# Patient Record
Sex: MALE | Race: WHITE | Employment: OTHER | URBAN - METROPOLITAN AREA
[De-identification: names, ages, dates, MRNs, and addresses within clinical notes are randomized per-mention and may not be internally consistent; named-entity substitution may affect disease eponyms.]

---

## 2024-03-19 ENCOUNTER — INITIAL CONSULT (OUTPATIENT)
Age: 64
End: 2024-03-19
Payer: MEDICARE

## 2024-03-19 VITALS
DIASTOLIC BLOOD PRESSURE: 90 MMHG | BODY MASS INDEX: 27.6 KG/M2 | SYSTOLIC BLOOD PRESSURE: 154 MMHG | HEIGHT: 70 IN | WEIGHT: 192.8 LBS | HEART RATE: 58 BPM

## 2024-03-19 DIAGNOSIS — E78.5 HYPERLIPIDEMIA, UNSPECIFIED HYPERLIPIDEMIA TYPE: ICD-10-CM

## 2024-03-19 DIAGNOSIS — I10 PRIMARY HYPERTENSION: ICD-10-CM

## 2024-03-19 DIAGNOSIS — R94.31 ABNORMAL EKG: ICD-10-CM

## 2024-03-19 DIAGNOSIS — R07.9 CHEST PAIN, UNSPECIFIED TYPE: Primary | ICD-10-CM

## 2024-03-19 PROCEDURE — 1123F ACP DISCUSS/DSCN MKR DOCD: CPT | Performed by: INTERNAL MEDICINE

## 2024-03-19 PROCEDURE — 3077F SYST BP >= 140 MM HG: CPT | Performed by: INTERNAL MEDICINE

## 2024-03-19 PROCEDURE — 99203 OFFICE O/P NEW LOW 30 MIN: CPT | Performed by: INTERNAL MEDICINE

## 2024-03-19 PROCEDURE — 93000 ELECTROCARDIOGRAM COMPLETE: CPT | Performed by: INTERNAL MEDICINE

## 2024-03-19 PROCEDURE — 3079F DIAST BP 80-89 MM HG: CPT | Performed by: INTERNAL MEDICINE

## 2024-03-19 RX ORDER — PITAVASTATIN CALCIUM 4.18 MG/1
TABLET, FILM COATED ORAL NIGHTLY
COMMUNITY

## 2024-03-19 RX ORDER — OMEPRAZOLE 40 MG/1
20 CAPSULE, DELAYED RELEASE ORAL DAILY
COMMUNITY
Start: 2024-02-17

## 2024-03-19 RX ORDER — TAMSULOSIN HYDROCHLORIDE 0.4 MG/1
0.4 CAPSULE ORAL DAILY
COMMUNITY
Start: 2024-03-11 | End: 2025-03-11

## 2024-03-19 RX ORDER — METOPROLOL SUCCINATE 50 MG/1
50 TABLET, EXTENDED RELEASE ORAL DAILY
Qty: 30 TABLET | Refills: 5 | Status: SHIPPED | OUTPATIENT
Start: 2024-03-19

## 2024-03-19 RX ORDER — MULTIVIT WITH MINERALS/LUTEIN
1000 TABLET ORAL 2 TIMES DAILY
COMMUNITY

## 2024-03-19 RX ORDER — ASPIRIN 81 MG/1
81 TABLET ORAL
COMMUNITY

## 2024-03-19 ASSESSMENT — ENCOUNTER SYMPTOMS
COLOR CHANGE: 0
NAUSEA: 0
BACK PAIN: 0
BOWEL INCONTINENCE: 0
ORTHOPNEA: 0
HEMOPTYSIS: 0
COUGH: 0
DIARRHEA: 0
WHEEZING: 0
BLURRED VISION: 0
SPUTUM PRODUCTION: 0
ABDOMINAL PAIN: 0
HOARSE VOICE: 0
HEMATOCHEZIA: 0
HEMATEMESIS: 0
VOMITING: 0
SHORTNESS OF BREATH: 1

## 2024-04-02 ENCOUNTER — OFFICE VISIT (OUTPATIENT)
Age: 64
End: 2024-04-02
Payer: MEDICARE

## 2024-04-02 VITALS
WEIGHT: 193.6 LBS | HEART RATE: 68 BPM | SYSTOLIC BLOOD PRESSURE: 138 MMHG | DIASTOLIC BLOOD PRESSURE: 82 MMHG | BODY MASS INDEX: 27.72 KG/M2 | HEIGHT: 70 IN

## 2024-04-02 DIAGNOSIS — I10 PRIMARY HYPERTENSION: ICD-10-CM

## 2024-04-02 DIAGNOSIS — E78.5 HYPERLIPIDEMIA, UNSPECIFIED HYPERLIPIDEMIA TYPE: ICD-10-CM

## 2024-04-02 DIAGNOSIS — R93.1 ABNORMAL NUCLEAR CARDIAC IMAGING TEST: ICD-10-CM

## 2024-04-02 DIAGNOSIS — R07.9 CHEST PAIN, UNSPECIFIED TYPE: ICD-10-CM

## 2024-04-02 DIAGNOSIS — R94.39 ABNORMAL NUCLEAR STRESS TEST: ICD-10-CM

## 2024-04-02 DIAGNOSIS — R94.31 ABNORMAL EKG: Primary | ICD-10-CM

## 2024-04-02 PROCEDURE — 3075F SYST BP GE 130 - 139MM HG: CPT | Performed by: INTERNAL MEDICINE

## 2024-04-02 PROCEDURE — 3079F DIAST BP 80-89 MM HG: CPT | Performed by: INTERNAL MEDICINE

## 2024-04-02 PROCEDURE — 1123F ACP DISCUSS/DSCN MKR DOCD: CPT | Performed by: INTERNAL MEDICINE

## 2024-04-02 PROCEDURE — 99214 OFFICE O/P EST MOD 30 MIN: CPT | Performed by: INTERNAL MEDICINE

## 2024-04-02 RX ORDER — SODIUM CHLORIDE 0.9 % (FLUSH) 0.9 %
5-40 SYRINGE (ML) INJECTION PRN
OUTPATIENT
Start: 2024-04-02

## 2024-04-02 RX ORDER — NITROGLYCERIN 0.4 MG/1
0.4 TABLET SUBLINGUAL EVERY 5 MIN PRN
Qty: 25 TABLET | Refills: 3 | Status: SHIPPED | OUTPATIENT
Start: 2024-04-02

## 2024-04-02 RX ORDER — SODIUM CHLORIDE 9 MG/ML
INJECTION, SOLUTION INTRAVENOUS PRN
OUTPATIENT
Start: 2024-04-02

## 2024-04-02 RX ORDER — SODIUM CHLORIDE 0.9 % (FLUSH) 0.9 %
5-40 SYRINGE (ML) INJECTION EVERY 12 HOURS SCHEDULED
OUTPATIENT
Start: 2024-04-02

## 2024-04-02 ASSESSMENT — ENCOUNTER SYMPTOMS
ORTHOPNEA: 0
COLOR CHANGE: 0
VOMITING: 0
BOWEL INCONTINENCE: 0
DIARRHEA: 0
HOARSE VOICE: 0
HEMATEMESIS: 0
HEMATOCHEZIA: 0
BACK PAIN: 0
HEMOPTYSIS: 0
COUGH: 0
SHORTNESS OF BREATH: 0
ABDOMINAL PAIN: 0
WHEEZING: 0
NAUSEA: 0
SPUTUM PRODUCTION: 0
BLURRED VISION: 0

## 2024-04-02 NOTE — PROGRESS NOTES
revascularization candidacy.Consider switching Livalo to Lipitor or Crestor in the future if possible.Continue ARB ( Edarbi).  -     Initiate PAT Protocol; Future  -     Left Heart Cath / Coronary Angiography; Standing  -     Left Heart Cath / Coronary Angiography  -     nitroGLYCERIN (NITROSTAT) 0.4 MG SL tablet; Place 1 tablet under the tongue every 5 minutes as needed for Chest pain up to max of 3 total doses. If no relief after 1 dose, call 911.    Hyperlipidemia, unspecified hyperlipidemia type:Continue Livalo for now.    Primary hypertension:Stable.Continue Edarbi and Toprol.Continue home BP and HR monitoring.    Other orders  -     Full code; Standing  -     Verify informed consent; Standing  -     Verify pre-procedure history and physical completed; Standing  -     Procedure Consent; Standing  -     Diagnosis for Procedure; Standing  -     Diet NPO Exceptions are: Sips of Water with Meds; Standing  -     Initiate Oxygen Therapy Protocol; Standing  -     CBC; Standing  -     Basic Metabolic Panel; Standing  -     sodium chloride flush 0.9 % injection 5-40 mL  -     sodium chloride flush 0.9 % injection 5-40 mL  -     0.9 % sodium chloride infusion          Disposition:    Return for Follow up after procedure.                JENNYFER ZELAYA MD  4/2/2024  3:49 PM

## 2024-04-03 PROBLEM — R93.1 ABNORMAL NUCLEAR CARDIAC IMAGING TEST: Status: ACTIVE | Noted: 2024-04-02

## 2024-05-03 DIAGNOSIS — R07.9 CHEST PAIN, UNSPECIFIED TYPE: ICD-10-CM

## 2024-05-03 LAB
ANION GAP SERPL CALC-SCNC: 11 MMOL/L (ref 9–18)
BUN SERPL-MCNC: 15 MG/DL (ref 8–23)
CALCIUM SERPL-MCNC: 9.4 MG/DL (ref 8.8–10.2)
CHLORIDE SERPL-SCNC: 100 MMOL/L (ref 98–107)
CO2 SERPL-SCNC: 27 MMOL/L (ref 20–28)
CREAT SERPL-MCNC: 0.82 MG/DL (ref 0.8–1.3)
ERYTHROCYTE [DISTWIDTH] IN BLOOD BY AUTOMATED COUNT: 12.8 % (ref 11.9–14.6)
GLUCOSE SERPL-MCNC: 100 MG/DL (ref 70–99)
HCT VFR BLD AUTO: 49.6 % (ref 41.1–50.3)
HGB BLD-MCNC: 16 G/DL (ref 13.6–17.2)
MCH RBC QN AUTO: 30.4 PG (ref 26.1–32.9)
MCHC RBC AUTO-ENTMCNC: 32.3 G/DL (ref 31.4–35)
MCV RBC AUTO: 94.3 FL (ref 82–102)
NRBC # BLD: 0 K/UL (ref 0–0.2)
PLATELET # BLD AUTO: 221 K/UL (ref 150–450)
PMV BLD AUTO: 9.7 FL (ref 9.4–12.3)
POTASSIUM SERPL-SCNC: 4.4 MMOL/L (ref 3.5–5.1)
RBC # BLD AUTO: 5.26 M/UL (ref 4.23–5.6)
SODIUM SERPL-SCNC: 138 MMOL/L (ref 136–145)
WBC # BLD AUTO: 6.3 K/UL (ref 4.3–11.1)

## 2024-05-08 NOTE — PROGRESS NOTES
Patient pre-assessment complete for [facility] scheduled for , arrival time 0730. Patient verified using . Patient instructed to bring a list of all home medications on the day of procedure. NPO status reinforced. Patient informed to take a full dose aspirin 325mg  or 81 mg x 4 on the day of procedure. Instructed they can take all other medications excluding vitamins & supplements. Patient verbalizes understanding of all instructions & denies any questions at this time.

## 2024-05-09 ENCOUNTER — HOSPITAL ENCOUNTER (OUTPATIENT)
Age: 64
Setting detail: OUTPATIENT SURGERY
Discharge: HOME | End: 2024-05-09
Attending: INTERNAL MEDICINE | Admitting: INTERNAL MEDICINE
Payer: MEDICARE

## 2024-05-09 VITALS
DIASTOLIC BLOOD PRESSURE: 78 MMHG | BODY MASS INDEX: 27.63 KG/M2 | WEIGHT: 193 LBS | HEIGHT: 70 IN | OXYGEN SATURATION: 96 % | RESPIRATION RATE: 10 BRPM | SYSTOLIC BLOOD PRESSURE: 122 MMHG | HEART RATE: 55 BPM

## 2024-05-09 DIAGNOSIS — R93.1 ABNORMAL NUCLEAR CARDIAC IMAGING TEST: ICD-10-CM

## 2024-05-09 PROBLEM — I20.0 ACCELERATING ANGINA (HCC): Status: ACTIVE | Noted: 2024-05-09

## 2024-05-09 LAB
ECHO BSA: 2.08 M2
EKG ATRIAL RATE: 59 BPM
EKG DIAGNOSIS: NORMAL
EKG P AXIS: 56 DEGREES
EKG P-R INTERVAL: 176 MS
EKG Q-T INTERVAL: 402 MS
EKG QRS DURATION: 98 MS
EKG QTC CALCULATION (BAZETT): 397 MS
EKG R AXIS: 41 DEGREES
EKG T AXIS: 64 DEGREES
EKG VENTRICULAR RATE: 59 BPM

## 2024-05-09 PROCEDURE — 6360000004 HC RX CONTRAST MEDICATION: Performed by: INTERNAL MEDICINE

## 2024-05-09 PROCEDURE — 99152 MOD SED SAME PHYS/QHP 5/>YRS: CPT | Performed by: INTERNAL MEDICINE

## 2024-05-09 PROCEDURE — C1769 GUIDE WIRE: HCPCS | Performed by: INTERNAL MEDICINE

## 2024-05-09 PROCEDURE — 93458 L HRT ARTERY/VENTRICLE ANGIO: CPT | Performed by: INTERNAL MEDICINE

## 2024-05-09 PROCEDURE — 2500000003 HC RX 250 WO HCPCS: Performed by: INTERNAL MEDICINE

## 2024-05-09 PROCEDURE — C1894 INTRO/SHEATH, NON-LASER: HCPCS | Performed by: INTERNAL MEDICINE

## 2024-05-09 PROCEDURE — 6360000002 HC RX W HCPCS: Performed by: INTERNAL MEDICINE

## 2024-05-09 PROCEDURE — 93010 ELECTROCARDIOGRAM REPORT: CPT | Performed by: INTERNAL MEDICINE

## 2024-05-09 PROCEDURE — 93005 ELECTROCARDIOGRAM TRACING: CPT | Performed by: INTERNAL MEDICINE

## 2024-05-09 PROCEDURE — 2709999900 HC NON-CHARGEABLE SUPPLY: Performed by: INTERNAL MEDICINE

## 2024-05-09 RX ORDER — MIDAZOLAM HYDROCHLORIDE 1 MG/ML
INJECTION INTRAMUSCULAR; INTRAVENOUS PRN
Status: DISCONTINUED | OUTPATIENT
Start: 2024-05-09 | End: 2024-05-09 | Stop reason: HOSPADM

## 2024-05-09 RX ORDER — LIDOCAINE HYDROCHLORIDE 10 MG/ML
INJECTION, SOLUTION INFILTRATION; PERINEURAL PRN
Status: DISCONTINUED | OUTPATIENT
Start: 2024-05-09 | End: 2024-05-09 | Stop reason: HOSPADM

## 2024-05-09 RX ORDER — HEPARIN SODIUM 200 [USP'U]/100ML
INJECTION, SOLUTION INTRAVENOUS CONTINUOUS PRN
Status: COMPLETED | OUTPATIENT
Start: 2024-05-09 | End: 2024-05-09

## 2024-05-09 NOTE — PROGRESS NOTES
Discharge instructions given per orders, voiced good understanding of R wrist care, medications & follow up care. Denies any questions

## 2024-05-09 NOTE — DISCHARGE INSTRUCTIONS
HEART CATHETERIZATION/ANGIOGRAPHY DISCHARGE INSTRUCTIONS    Check puncture site frequently for swelling or bleeding. If there is any bleeding, apply pressure over the area with a clean towel or washcloth and call 911. Notify your doctor for any redness, swelling, drainage, or oozing from the puncture site. Notify your doctor for any fever or chills.  If the extremity becomes cold, numb, or painful call [facility]  Activity should be limited for the next 48 hours. No heavy lifting, pushing, pulling  or strenuous activity for 48 hours. No heavy lifting (anything over 10 pounds) for 3 days.  You may resume your usual diet. Drink more fluids than usual.  Have a responsible person drive you home and stay with you for at least 24 hours after your heart catheterization/angiography.  You may remove bandage from your R wrist in 24 hours. You may shower in 24 hours. No tub baths, hot tubs, or swimming for 1 week. Do not place any lotions, creams, powders, or ointments over puncture site for 1 week. You may place a clean band-aid over the puncture site each day for 5 days. Change daily.        Sedation for a Medical Procedure: Care Instructions     You were given a sedative medication during your visit. While many of the effects will have worn   off before you leave; you may continue to feel some effects for several hours.      Common side effects from sedation include:  Feeling sleepy. (Your doctors and nurses will make sure you are not too sleepy to go home.)  Nausea and vomiting. This usually does not last long.  Feeling tired.     How can you care for yourself at home?  Activity    Don't do anything for 24 hours that requires attention to detail. It takes time for the medicine effects to completely wear off.     Do not make important legal decisions for 24 hours.     Do not sign any legal documents for 24 hours.     Do not drink alcohol today     For your safety, you should not drive or operate heavy machinery for

## 2024-05-09 NOTE — PROGRESS NOTES
TRANSFER - IN REPORT:    Verbal report received from [employer]aning on Marc Craig being received from [facility] for routine post-op      Report consisted of patient’s Situation, Background, Assessment and Recommendations(SBAR).     Information from the following report(s) Procedure Summary was reviewed with the receiving nurse.    Opportunity for questions and clarification was provided.      Assessment completed upon patient’s arrival to unit and care assumed.

## 2024-05-09 NOTE — PROGRESS NOTES
TRANSFER - OUT REPORT:    Verbal report given to Madhavi Choudhury RN on Marc Craig  being transferred to CPRU for routine progression of patient care       Report consisted of patient's Situation, Background, Assessment and   Recommendations(SBAR).     Information from the following report(s) Nurse Handoff Report was reviewed with the receiving nurse.         [facility] with Dr Burt  No interventions  R Radial  TR band at 12mL  Versed 4mg  Fentanyl 75mcg  Heparin 5000 units

## 2024-05-09 NOTE — PROGRESS NOTES
Patient received to CPR[facility] room # 12  Ambulatory from [facility]. Patient scheduled for C today with Dr Burt. Procedure reviewed & questions answered, voiced good understanding consent obtained & placed on chart. All medications and medical history reviewed. Will prep patient per orders. Patient & family updated on plan of care.      The patient has a fraility score of 3-MANAGING WELL, based on ambulation without assistance.    Patient took Aspirin 324mg today at 0430 prior to arrival.

## 2024-05-13 ENCOUNTER — HOSPITAL ENCOUNTER (EMERGENCY)
Age: 64
Discharge: HOME | End: 2024-05-13
Attending: EMERGENCY MEDICINE
Payer: MEDICARE

## 2024-05-13 ENCOUNTER — APPOINTMENT (OUTPATIENT)
Dept: GENERAL RADIOLOGY | Age: 64
End: 2024-05-13
Payer: MEDICARE

## 2024-05-13 VITALS
SYSTOLIC BLOOD PRESSURE: 132 MMHG | HEART RATE: 52 BPM | TEMPERATURE: 98.8 F | RESPIRATION RATE: 18 BRPM | HEIGHT: 70 IN | BODY MASS INDEX: 27.63 KG/M2 | WEIGHT: 193 LBS | OXYGEN SATURATION: 96 % | DIASTOLIC BLOOD PRESSURE: 88 MMHG

## 2024-05-13 DIAGNOSIS — I25.10 CORONARY ARTERY DISEASE INVOLVING NATIVE HEART WITHOUT ANGINA PECTORIS, UNSPECIFIED VESSEL OR LESION TYPE: ICD-10-CM

## 2024-05-13 DIAGNOSIS — R42 LIGHTHEADEDNESS: Primary | ICD-10-CM

## 2024-05-13 LAB
ALBUMIN SERPL-MCNC: 4.1 G/DL (ref 3.2–4.6)
ALBUMIN/GLOB SERPL: 1.2 (ref 1–1.9)
ALP SERPL-CCNC: 69 U/L (ref 40–129)
ALT SERPL-CCNC: 16 U/L (ref 12–65)
ANION GAP BLD CALC-SCNC: 9.3 MMOL/L
ANION GAP SERPL CALC-SCNC: 10 MMOL/L (ref 9–18)
AST SERPL-CCNC: 24 U/L (ref 15–37)
BASOPHILS # BLD: 0 K/UL (ref 0–0.2)
BASOPHILS NFR BLD: 1 % (ref 0–2)
BILIRUB SERPL-MCNC: 0.3 MG/DL (ref 0–1.2)
BUN BLD-MCNC: 19 MG/DL (ref 8–26)
BUN SERPL-MCNC: 17 MG/DL (ref 8–23)
CALCIUM SERPL-MCNC: 9.1 MG/DL (ref 8.8–10.2)
CHLORIDE BLD-SCNC: 108 MMOL/L (ref 98–107)
CHLORIDE SERPL-SCNC: 104 MMOL/L (ref 98–107)
CO2 BLD-SCNC: 25.7 MMOL/L (ref 21–32)
CO2 SERPL-SCNC: 23 MMOL/L (ref 20–28)
CREAT BLD-MCNC: 0.98 MG/DL (ref 0.8–1.5)
CREAT SERPL-MCNC: 0.94 MG/DL (ref 0.8–1.3)
DIFFERENTIAL METHOD BLD: NORMAL
EKG ATRIAL RATE: 60 BPM
EKG DIAGNOSIS: ABNORMAL
EKG P AXIS: 46 DEGREES
EKG P-R INTERVAL: 188 MS
EKG Q-T INTERVAL: 404 MS
EKG QRS DURATION: 130 MS
EKG QTC CALCULATION (BAZETT): 404 MS
EKG R AXIS: 50 DEGREES
EKG T AXIS: 81 DEGREES
EKG VENTRICULAR RATE: 60 BPM
EOSINOPHIL # BLD: 0.1 K/UL (ref 0–0.8)
EOSINOPHIL NFR BLD: 2 % (ref 0.5–7.8)
ERYTHROCYTE [DISTWIDTH] IN BLOOD BY AUTOMATED COUNT: 12.5 % (ref 11.9–14.6)
GLOBULIN SER CALC-MCNC: 3.4 G/DL (ref 2.3–3.5)
GLUCOSE BLD-MCNC: 96 MG/DL (ref 65–100)
GLUCOSE SERPL-MCNC: 115 MG/DL (ref 70–99)
HCT VFR BLD AUTO: 48.4 % (ref 41.1–50.3)
HGB BLD-MCNC: 16.4 G/DL (ref 13.6–17.2)
IMM GRANULOCYTES # BLD AUTO: 0 K/UL (ref 0–0.5)
IMM GRANULOCYTES NFR BLD AUTO: 1 % (ref 0–5)
LYMPHOCYTES # BLD: 0.9 K/UL (ref 0.5–4.6)
LYMPHOCYTES NFR BLD: 13 % (ref 13–44)
MAGNESIUM SERPL-MCNC: 1.8 MG/DL (ref 1.8–2.4)
MCH RBC QN AUTO: 31.2 PG (ref 26.1–32.9)
MCHC RBC AUTO-ENTMCNC: 33.9 G/DL (ref 31.4–35)
MCV RBC AUTO: 92 FL (ref 82–102)
MONOCYTES # BLD: 0.7 K/UL (ref 0.1–1.3)
MONOCYTES NFR BLD: 11 % (ref 4–12)
NEUTS SEG # BLD: 4.8 K/UL (ref 1.7–8.2)
NEUTS SEG NFR BLD: 72 % (ref 43–78)
NRBC # BLD: 0 K/UL (ref 0–0.2)
NT PRO BNP: 181 PG/ML (ref 0–125)
PHOSPHATE SERPL-MCNC: 2.7 MG/DL (ref 2.5–4.5)
PLATELET # BLD AUTO: 209 K/UL (ref 150–450)
PMV BLD AUTO: 9.5 FL (ref 9.4–12.3)
POTASSIUM BLD-SCNC: 5.2 MMOL/L (ref 3.5–5.1)
POTASSIUM SERPL-SCNC: 4 MMOL/L (ref 3.5–5.1)
POTASSIUM SERPL-SCNC: NORMAL MMOL/L (ref 3.5–5.1)
PROT SERPL-MCNC: 7.4 G/DL (ref 6.3–8.2)
RBC # BLD AUTO: 5.26 M/UL (ref 4.23–5.6)
SODIUM BLD-SCNC: 143 MMOL/L (ref 136–145)
SODIUM SERPL-SCNC: 137 MMOL/L (ref 136–145)
TROPONIN T SERPL HS-MCNC: 11 NG/L (ref 0–22)
TROPONIN T SERPL HS-MCNC: 11 NG/L (ref 0–22)
WBC # BLD AUTO: 6.6 K/UL (ref 4.3–11.1)

## 2024-05-13 PROCEDURE — 80053 COMPREHEN METABOLIC PANEL: CPT

## 2024-05-13 PROCEDURE — 80047 BASIC METABLC PNL IONIZED CA: CPT

## 2024-05-13 PROCEDURE — 84132 ASSAY OF SERUM POTASSIUM: CPT

## 2024-05-13 PROCEDURE — 99285 EMERGENCY DEPT VISIT HI MDM: CPT

## 2024-05-13 PROCEDURE — 85025 COMPLETE CBC W/AUTO DIFF WBC: CPT

## 2024-05-13 PROCEDURE — 93005 ELECTROCARDIOGRAM TRACING: CPT | Performed by: EMERGENCY MEDICINE

## 2024-05-13 PROCEDURE — 83880 ASSAY OF NATRIURETIC PEPTIDE: CPT

## 2024-05-13 PROCEDURE — 2580000003 HC RX 258: Performed by: EMERGENCY MEDICINE

## 2024-05-13 PROCEDURE — 83735 ASSAY OF MAGNESIUM: CPT

## 2024-05-13 PROCEDURE — 84484 ASSAY OF TROPONIN QUANT: CPT

## 2024-05-13 PROCEDURE — 71045 X-RAY EXAM CHEST 1 VIEW: CPT

## 2024-05-13 PROCEDURE — 93010 ELECTROCARDIOGRAM REPORT: CPT | Performed by: INTERNAL MEDICINE

## 2024-05-13 PROCEDURE — 84100 ASSAY OF PHOSPHORUS: CPT

## 2024-05-13 RX ORDER — 0.9 % SODIUM CHLORIDE 0.9 %
1000 INTRAVENOUS SOLUTION INTRAVENOUS
Status: COMPLETED | OUTPATIENT
Start: 2024-05-13 | End: 2024-05-13

## 2024-05-13 RX ADMIN — SODIUM CHLORIDE 1000 ML: 9 INJECTION, SOLUTION INTRAVENOUS at 14:33

## 2024-05-13 ASSESSMENT — ENCOUNTER SYMPTOMS
BLOOD IN STOOL: 0
EYE DISCHARGE: 0
VOMITING: 0
COUGH: 0
EYE ITCHING: 0
CHEST TIGHTNESS: 0
NAUSEA: 0
SHORTNESS OF BREATH: 0

## 2024-05-13 ASSESSMENT — LIFESTYLE VARIABLES
HOW MANY STANDARD DRINKS CONTAINING ALCOHOL DO YOU HAVE ON A TYPICAL DAY: 1 OR 2
HOW OFTEN DO YOU HAVE A DRINK CONTAINING ALCOHOL: 2-4 TIMES A MONTH

## 2024-05-13 ASSESSMENT — PAIN SCALES - GENERAL
PAINLEVEL_OUTOF10: 0
PAINLEVEL_OUTOF10: 0

## 2024-05-13 ASSESSMENT — PAIN DESCRIPTION - PAIN TYPE: TYPE: ACUTE PAIN

## 2024-05-13 ASSESSMENT — PAIN - FUNCTIONAL ASSESSMENT
PAIN_FUNCTIONAL_ASSESSMENT: 0-10
PAIN_FUNCTIONAL_ASSESSMENT: 0-10

## 2024-05-13 NOTE — DISCHARGE INSTRUCTIONS
Continue all your current medications  Drink plenty fluids  Get plenty of rest  Call your cardiologist in the morning to discuss follow-up and further evaluation    Call your doctor or the follow up doctor to set up appointment for recheck visit    Return to ER for any worsening symptoms or new problems which may arise

## 2024-05-13 NOTE — ED NOTES
Patient mobility status  with no difficulty. Provider aware     I have reviewed discharge instructions with the patient.  The patient verbalized understanding.    Patient left ED via Discharge Method: ambulatory to Home with Spouse.    Opportunity for questions and clarification provided.     Patient given 0 scripts.           Kathrine Manjarrez RN  05/13/24 [id number]

## 2024-05-13 NOTE — ED TRIAGE NOTES
Patient to triage via EMS from work after having a spell of lightheaded, dizziness, and all over tingling. Pt states he had a cath about 4 days ago with no stents placed. Per pt the tingling has subsided but he is still feeling a little lightheaded. Pt state he took a nitro prior to EMS arrival but states it did not really make any difference.

## 2024-05-13 NOTE — ED PROVIDER NOTES
Emergency Department Provider Note       PCP: Michael Faustin Jr., MD   Age: 63 y.o.   Sex: male     DISPOSITION Decision To Discharge 05/13/2024 05:36:11 PM       ICD-10-CM    1. Lightheadedness  R42       2. Coronary artery disease involving native heart without angina pectoris, unspecified vessel or lesion type  I25.10           Medical Decision Making     Left heart catheterization 5/9/2024:  Conclusion         Obstructive one-vessel coronary artery disease involving a occluded mid LAD with left to left collaterals.    Ischemic cardiomyopathy with mildly reduced LV systolic function.  EF 40 to 45%.  Apical akinesis.    Right radial approach: Tiger 4.0 for left main coronary injection and JR 5 for right coronary injection.    Plan: Patient denies any angina for the last several months.  Fixed defect on stress testing without any reversible ischemia.  Appears completed apical infarct due to occlusion of the mid LAD.  Medical management.  63-year-old male patient with a history of coronary disease here with episodes of lightheadedness while at work today  Patient hemodynamically stable here he is on Toprol and heart rate has been in the 50s for the most part  Asymptomatic during his stay here in the ER  Troponin negative x 2 BNP only minimally above normal with a clear chest x-ray and no respiratory complaints  Given his negative workup and remaining asymptomatic, I feel the patient is stable to be discharged  He is encouraged to follow-up with his cardiologist and primary care doctors tomorrow to discuss any further evaluation and treatment  Return precautions discussed with patient and family at bedside     1 acute illness with systemic symptoms.  Patient was discharged risks and benefits of hospitalization were considered.  Chronic medical problems impacting care include CAD hypertension hyperlipidemia.  Shared medical decision making was utilized in creating the patients health plan today.    I

## 2024-05-14 ENCOUNTER — TELEPHONE (OUTPATIENT)
Age: 64
End: 2024-05-14

## 2024-05-14 DIAGNOSIS — I10 PRIMARY HYPERTENSION: ICD-10-CM

## 2024-05-14 DIAGNOSIS — R07.9 CHEST PAIN, UNSPECIFIED TYPE: ICD-10-CM

## 2024-05-14 DIAGNOSIS — R94.31 ABNORMAL EKG: ICD-10-CM

## 2024-05-14 RX ORDER — METOPROLOL SUCCINATE 50 MG/1
25 TABLET, EXTENDED RELEASE ORAL DAILY
Qty: 45 TABLET | Refills: 3
Start: 2024-05-14

## 2024-05-14 NOTE — TELEPHONE ENCOUNTER
Suddenly felt very weak, light headed, and dizzy with tingling in arms, when sitting at desk at work at [employer], yesterday.  Seen in [facility] ER, and was told all tests normal, but HR-49-52 on monitor in ER, so ER doctor suggested lower Toprol XL dose.   No palpitations, SOB, or chest pain.   Feeling okay, today.   Taking ASA 81 mg qd, Edarbi 40 mg qd, Toprol XL 50 mg qd, and Livalo 4 mg qd.   Next scheduled appointment with Dr. Arvizu is 5/28/24.     Patient asks if he may decrease Toprol XL dose to 25 mg qd and asks for any other recommendations.

## 2024-05-14 NOTE — TELEPHONE ENCOUNTER
passed out at work yesterday. They told him they think the metoprolol is too strong causing heart rate to be too low. Please call

## 2024-05-14 NOTE — TELEPHONE ENCOUNTER
Advised patient of Dr. Patiño's response. Advised patient to continue to monitor BP and HR and call with any further questions or concerns. Patient verbalized understanding.   Requested Prescriptions     Signed Prescriptions Disp Refills    metoprolol succinate (TOPROL XL) 50 MG extended release tablet 45 tablet 3     Sig: Take 0.5 tablets by mouth daily     Authorizing Provider: JENNYFER ZELAYA     Ordering User: HUNTER STOUT

## 2024-05-28 ENCOUNTER — OFFICE VISIT (OUTPATIENT)
Age: 64
End: 2024-05-28
Payer: MEDICARE

## 2024-05-28 VITALS
BODY MASS INDEX: 26.77 KG/M2 | HEIGHT: 70 IN | WEIGHT: 187 LBS | DIASTOLIC BLOOD PRESSURE: 78 MMHG | SYSTOLIC BLOOD PRESSURE: 130 MMHG | HEART RATE: 64 BPM

## 2024-05-28 DIAGNOSIS — I25.10 CORONARY ARTERY DISEASE INVOLVING NATIVE CORONARY ARTERY OF NATIVE HEART WITHOUT ANGINA PECTORIS: ICD-10-CM

## 2024-05-28 DIAGNOSIS — I10 PRIMARY HYPERTENSION: Primary | ICD-10-CM

## 2024-05-28 DIAGNOSIS — E78.5 HYPERLIPIDEMIA, UNSPECIFIED HYPERLIPIDEMIA TYPE: ICD-10-CM

## 2024-05-28 PROCEDURE — 3078F DIAST BP <80 MM HG: CPT | Performed by: INTERNAL MEDICINE

## 2024-05-28 PROCEDURE — 1123F ACP DISCUSS/DSCN MKR DOCD: CPT | Performed by: INTERNAL MEDICINE

## 2024-05-28 PROCEDURE — 3075F SYST BP GE 130 - 139MM HG: CPT | Performed by: INTERNAL MEDICINE

## 2024-05-28 PROCEDURE — 99214 OFFICE O/P EST MOD 30 MIN: CPT | Performed by: INTERNAL MEDICINE

## 2024-05-28 ASSESSMENT — ENCOUNTER SYMPTOMS
HEMATOCHEZIA: 0
DIARRHEA: 0
HOARSE VOICE: 0
HEMATEMESIS: 0
COLOR CHANGE: 0
SPUTUM PRODUCTION: 0
BLURRED VISION: 0
CHEST TIGHTNESS: 0
SHORTNESS OF BREATH: 0
ABDOMINAL PAIN: 0
WHEEZING: 0
BOWEL INCONTINENCE: 0
ORTHOPNEA: 0

## 2024-05-28 NOTE — PROGRESS NOTES
Differential Type AUTOMATED      Neutrophils % 72 43 - 78 %    Lymphocytes % 13 13 - 44 %    Monocytes % 11 4.0 - 12.0 %    Eosinophils % 2 0.5 - 7.8 %    Basophils % 1 0.0 - 2.0 %    Immature Granulocytes % 1 0.0 - 5.0 %    Neutrophils Absolute 4.8 1.7 - 8.2 K/UL    Lymphocytes Absolute 0.9 0.5 - 4.6 K/UL    Monocytes Absolute 0.7 0.1 - 1.3 K/UL    Eosinophils Absolute 0.1 0.0 - 0.8 K/UL    Basophils Absolute 0.0 0.0 - 0.2 K/UL    Immature Granulocytes Absolute 0.0 0.0 - 0.5 K/UL   Magnesium    Collection Time: 05/13/24  1:43 PM   Result Value Ref Range    Magnesium 1.8 1.8 - 2.4 mg/dL   Comprehensive Metabolic Panel    Collection Time: 05/13/24  1:43 PM   Result Value Ref Range    Sodium 137 136 - 145 mmol/L    Potassium 4.0 3.5 - 5.1 mmol/L    Chloride 104 98 - 107 mmol/L    CO2 23 20 - 28 mmol/L    Anion Gap 10 9 - 18 mmol/L    Glucose 115 (H) 70 - 99 mg/dL    BUN 17 8 - 23 MG/DL    Creatinine 0.94 0.80 - 1.30 MG/DL    Est, Glom Filt Rate 90 >60 ml/min/1.73m2    Calcium 9.1 8.8 - 10.2 MG/DL    Total Bilirubin 0.3 0.0 - 1.2 MG/DL    ALT 16 12 - 65 U/L    AST 24 15 - 37 U/L    Alk Phosphatase 69 40 - 129 U/L    Total Protein 7.4 6.3 - 8.2 g/dL    Albumin 4.1 3.2 - 4.6 g/dL    Globulin 3.4 2.3 - 3.5 g/dL    Albumin/Globulin Ratio 1.2 1.0 - 1.9     Phosphorus    Collection Time: 05/13/24  1:43 PM   Result Value Ref Range    Phosphorus 2.7 2.5 - 4.5 MG/DL   Troponin    Collection Time: 05/13/24  1:43 PM   Result Value Ref Range    Troponin T 11.0 0 - 22 ng/L   EKG 12 Lead    Collection Time: 05/13/24  1:47 PM   Result Value Ref Range    Ventricular Rate 60 BPM    Atrial Rate 60 BPM    P-R Interval 188 ms    QRS Duration 130 ms    Q-T Interval 404 ms    QTc Calculation (Bazett) 404 ms    P Axis 46 degrees    R Axis 50 degrees    T Axis 81 degrees    Diagnosis       Sinus rhythm  Nonspecific intraventricular conduction delay  Anterior infarct, age indeterminate  Anterolateral ST and T wave changes, consider

## 2024-08-27 DIAGNOSIS — I10 PRIMARY HYPERTENSION: ICD-10-CM

## 2024-08-27 DIAGNOSIS — R07.9 CHEST PAIN, UNSPECIFIED TYPE: ICD-10-CM

## 2024-08-27 DIAGNOSIS — R94.31 ABNORMAL EKG: ICD-10-CM

## 2024-08-27 RX ORDER — METOPROLOL SUCCINATE 50 MG/1
50 TABLET, EXTENDED RELEASE ORAL DAILY
Qty: 90 TABLET | Refills: 1 | Status: SHIPPED | OUTPATIENT
Start: 2024-08-27

## 2024-09-03 ENCOUNTER — OFFICE VISIT (OUTPATIENT)
Age: 64
End: 2024-09-03
Payer: MEDICARE

## 2024-09-03 VITALS
HEIGHT: 70 IN | BODY MASS INDEX: 25.77 KG/M2 | WEIGHT: 180 LBS | SYSTOLIC BLOOD PRESSURE: 122 MMHG | HEART RATE: 76 BPM | DIASTOLIC BLOOD PRESSURE: 60 MMHG

## 2024-09-03 DIAGNOSIS — R07.9 CHEST PAIN, UNSPECIFIED TYPE: ICD-10-CM

## 2024-09-03 DIAGNOSIS — I25.10 CORONARY ARTERY DISEASE INVOLVING NATIVE CORONARY ARTERY OF NATIVE HEART WITHOUT ANGINA PECTORIS: Primary | ICD-10-CM

## 2024-09-03 DIAGNOSIS — I10 PRIMARY HYPERTENSION: ICD-10-CM

## 2024-09-03 DIAGNOSIS — E78.5 HYPERLIPIDEMIA, UNSPECIFIED HYPERLIPIDEMIA TYPE: ICD-10-CM

## 2024-09-03 DIAGNOSIS — R94.31 ABNORMAL EKG: ICD-10-CM

## 2024-09-03 PROCEDURE — 3074F SYST BP LT 130 MM HG: CPT | Performed by: INTERNAL MEDICINE

## 2024-09-03 PROCEDURE — 99214 OFFICE O/P EST MOD 30 MIN: CPT | Performed by: INTERNAL MEDICINE

## 2024-09-03 PROCEDURE — 1123F ACP DISCUSS/DSCN MKR DOCD: CPT | Performed by: INTERNAL MEDICINE

## 2024-09-03 PROCEDURE — 3078F DIAST BP <80 MM HG: CPT | Performed by: INTERNAL MEDICINE

## 2024-09-03 RX ORDER — METOPROLOL SUCCINATE 50 MG/1
50 TABLET, EXTENDED RELEASE ORAL DAILY
Qty: 90 TABLET | Refills: 1 | Status: SHIPPED | OUTPATIENT
Start: 2024-09-03

## 2024-09-03 ASSESSMENT — ENCOUNTER SYMPTOMS
SHORTNESS OF BREATH: 0
ORTHOPNEA: 0
HEMATEMESIS: 0
HOARSE VOICE: 0
HEMATOCHEZIA: 0
BLURRED VISION: 0
CHEST TIGHTNESS: 0
DIARRHEA: 0
BOWEL INCONTINENCE: 0
ABDOMINAL PAIN: 0
COLOR CHANGE: 0
WHEEZING: 0
SPUTUM PRODUCTION: 0

## 2024-09-03 NOTE — PROGRESS NOTES
visit:    Coronary artery disease involving native coronary artery of native heart without angina pectoris:Stable.No chest pain reported.Continue Toprol,prn NTG,Repatha,ASA,and Livalo.    Chest pain, unspecified type:Resolved.  -     metoprolol succinate (TOPROL XL) 50 MG extended release tablet; Take 1 tablet by mouth daily    Primary hypertension:Stable and at goal.Continue Toprol and home BP and HR monitoring.  -     metoprolol succinate (TOPROL XL) 50 MG extended release tablet; Take 1 tablet by mouth daily    Abnormal EKG  -     metoprolol succinate (TOPROL XL) 50 MG extended release tablet; Take 1 tablet by mouth daily    Hyperlipidemia, unspecified hyperlipidemia type:Stable.Followed by PCP.Target LDL<70.Continue Repatha and Livalo.          Disposition:    Return in about 6 months (around 3/3/2025).                JENNYFER ZELAYA MD  9/3/2024  4:58 PM

## 2024-09-17 ENCOUNTER — TELEPHONE (OUTPATIENT)
Age: 64
End: 2024-09-17

## 2025-05-09 ENCOUNTER — OFFICE VISIT (OUTPATIENT)
Age: 65
End: 2025-05-09
Payer: MEDICARE

## 2025-05-09 VITALS
BODY MASS INDEX: 26.2 KG/M2 | HEIGHT: 70 IN | HEART RATE: 46 BPM | WEIGHT: 183 LBS | SYSTOLIC BLOOD PRESSURE: 134 MMHG | DIASTOLIC BLOOD PRESSURE: 70 MMHG

## 2025-05-09 DIAGNOSIS — E78.5 HYPERLIPIDEMIA, UNSPECIFIED HYPERLIPIDEMIA TYPE: ICD-10-CM

## 2025-05-09 DIAGNOSIS — R00.1 BRADYCARDIA: ICD-10-CM

## 2025-05-09 DIAGNOSIS — I10 PRIMARY HYPERTENSION: ICD-10-CM

## 2025-05-09 DIAGNOSIS — I25.10 CORONARY ARTERY DISEASE INVOLVING NATIVE CORONARY ARTERY OF NATIVE HEART WITHOUT ANGINA PECTORIS: Primary | ICD-10-CM

## 2025-05-09 PROCEDURE — 3078F DIAST BP <80 MM HG: CPT | Performed by: INTERNAL MEDICINE

## 2025-05-09 PROCEDURE — 3075F SYST BP GE 130 - 139MM HG: CPT | Performed by: INTERNAL MEDICINE

## 2025-05-09 PROCEDURE — 99214 OFFICE O/P EST MOD 30 MIN: CPT | Performed by: INTERNAL MEDICINE

## 2025-05-09 PROCEDURE — 1123F ACP DISCUSS/DSCN MKR DOCD: CPT | Performed by: INTERNAL MEDICINE

## 2025-05-09 PROCEDURE — 93000 ELECTROCARDIOGRAM COMPLETE: CPT | Performed by: INTERNAL MEDICINE

## 2025-05-09 PROCEDURE — 1126F AMNT PAIN NOTED NONE PRSNT: CPT | Performed by: INTERNAL MEDICINE

## 2025-05-09 PROCEDURE — 1159F MED LIST DOCD IN RCRD: CPT | Performed by: INTERNAL MEDICINE

## 2025-05-09 PROCEDURE — 1160F RVW MEDS BY RX/DR IN RCRD: CPT | Performed by: INTERNAL MEDICINE

## 2025-05-09 RX ORDER — METOPROLOL SUCCINATE 25 MG/1
TABLET, EXTENDED RELEASE ORAL
Qty: 90 TABLET | Refills: 3 | Status: SHIPPED | OUTPATIENT
Start: 2025-05-09

## 2025-05-09 ASSESSMENT — ENCOUNTER SYMPTOMS
COLOR CHANGE: 0
BLURRED VISION: 0
DIARRHEA: 0
SPUTUM PRODUCTION: 0
SHORTNESS OF BREATH: 0
ORTHOPNEA: 0
WHEEZING: 0
CHEST TIGHTNESS: 0
HEMATOCHEZIA: 0
HEMATEMESIS: 0
HOARSE VOICE: 0
ABDOMINAL PAIN: 0

## 2025-05-09 NOTE — PROGRESS NOTES
\"CHLST\", \"CHOLV\", \"HDL\", \"HDLPOC\", \"HDLC\", \"LDL\", \"LDLC\", \"VLDLC\", \"VLDL\"  No results found for any visits on 05/09/25.    ASSESSMENT and PLAN    Marc was seen today for coronary artery disease.    Diagnoses and all orders for this visit:    Coronary artery disease involving native coronary artery of native heart without angina pectoris: Stable with no reported angina.  Continue conservative medical therapy with Toprol as needed nitroglycerin aspirin and Repatha.  -     EKG 12 lead  -     metoprolol succinate (TOPROL XL) 25 MG extended release tablet; Take 1/2 po daily.    Primary hypertension: Stable and near goal.  Continue Toprol Edarbi and ambulatory BP and heart rate monitoring.  -     metoprolol succinate (TOPROL XL) 25 MG extended release tablet; Take 1/2 po daily.    Bradycardia: Asymptomatic.  Profound sinus bradycardia is noted.  Decrease Toprol from 25 mg daily to 12.5 mg daily and monitor ambulatory blood pressure and heart rate.  -     metoprolol succinate (TOPROL XL) 25 MG extended release tablet; Take 1/2 po daily.    Hyperlipidemia, unspecified hyperlipidemia type: Status unknown.  Continue Repatha.  Target LDL less than 70.  Continue annual lipid panel with PCP.          Disposition:    Return in about 6 months (around 11/9/2025) for Follow up after Med change.                JENNYFER ZELAYA MD  5/9/2025  3:50 PM

## (undated) DEVICE — CATHETER 5FR PIG 145 MEDTRONIC 110CM

## (undated) DEVICE — VASC-BAND REG

## (undated) DEVICE — RADIFOCUS OPTITORQUE ANGIOGRAPHIC CATHETER: Brand: OPTITORQUE

## (undated) DEVICE — GUIDEWIRE 035IN 210CM PTFE COAT FIX COR J TIP 15MM FIRM BODY

## (undated) DEVICE — CATHETER DIAG AD 5FR L100CM COR NYL JUDKINS R 5 DILATED

## (undated) DEVICE — GLIDESHEATH SLENDER STAINLESS STEEL KIT: Brand: GLIDESHEATH SLENDER